# Patient Record
Sex: FEMALE | Race: OTHER | HISPANIC OR LATINO
[De-identification: names, ages, dates, MRNs, and addresses within clinical notes are randomized per-mention and may not be internally consistent; named-entity substitution may affect disease eponyms.]

---

## 2017-08-25 VITALS
SYSTOLIC BLOOD PRESSURE: 144 MMHG | HEART RATE: 59 BPM | RESPIRATION RATE: 15 BRPM | WEIGHT: 119 LBS | DIASTOLIC BLOOD PRESSURE: 74 MMHG | HEIGHT: 61 IN | BODY MASS INDEX: 22.47 KG/M2

## 2017-11-08 ENCOUNTER — RECORD ABSTRACTING (OUTPATIENT)
Age: 77
End: 2017-11-08

## 2017-11-08 DIAGNOSIS — M85.80 OTHER SPECIFIED DISORDERS OF BONE DENSITY AND STRUCTURE, UNSPECIFIED SITE: ICD-10-CM

## 2017-11-08 DIAGNOSIS — Z92.89 PERSONAL HISTORY OF OTHER MEDICAL TREATMENT: ICD-10-CM

## 2017-11-08 DIAGNOSIS — E53.8 DEFICIENCY OF OTHER SPECIFIED B GROUP VITAMINS: ICD-10-CM

## 2017-11-08 DIAGNOSIS — D64.9 ANEMIA, UNSPECIFIED: ICD-10-CM

## 2017-11-08 DIAGNOSIS — D21.9 BENIGN NEOPLASM OF CONNECTIVE AND OTHER SOFT TISSUE, UNSPECIFIED: ICD-10-CM

## 2017-11-21 ENCOUNTER — APPOINTMENT (OUTPATIENT)
Dept: ENDOCRINOLOGY | Facility: CLINIC | Age: 77
End: 2017-11-21
Payer: MEDICARE

## 2017-11-21 VITALS
TEMPERATURE: 98.9 F | HEART RATE: 93 BPM | SYSTOLIC BLOOD PRESSURE: 140 MMHG | RESPIRATION RATE: 16 BRPM | OXYGEN SATURATION: 97 % | DIASTOLIC BLOOD PRESSURE: 78 MMHG | HEIGHT: 61 IN | WEIGHT: 119 LBS | BODY MASS INDEX: 22.47 KG/M2

## 2017-11-21 LAB — GLUCOSE BLDC GLUCOMTR-MCNC: 161

## 2017-11-21 PROCEDURE — 82962 GLUCOSE BLOOD TEST: CPT

## 2017-11-21 PROCEDURE — 99214 OFFICE O/P EST MOD 30 MIN: CPT | Mod: 25

## 2017-11-21 RX ORDER — MOMETASONE FUROATE 1 MG/G
0.1 CREAM TOPICAL
Refills: 0 | Status: COMPLETED | COMMUNITY
End: 2017-11-21

## 2017-11-21 RX ORDER — PNV NO.95/FERROUS FUM/FOLIC AC 28MG-0.8MG
TABLET ORAL
Refills: 0 | Status: COMPLETED | COMMUNITY
End: 2017-11-21

## 2017-11-21 RX ORDER — LOSARTAN POTASSIUM 25 MG/1
25 TABLET, FILM COATED ORAL
Refills: 0 | Status: COMPLETED | COMMUNITY
End: 2017-11-21

## 2017-11-21 RX ORDER — GLIPIZIDE 2.5 MG/1
2.5 TABLET, EXTENDED RELEASE ORAL
Refills: 0 | Status: COMPLETED | COMMUNITY
End: 2017-11-21

## 2017-11-21 RX ORDER — SIMVASTATIN 10 MG/1
10 TABLET, FILM COATED ORAL
Refills: 0 | Status: COMPLETED | COMMUNITY
End: 2017-11-21

## 2017-11-21 RX ORDER — METFORMIN HYDROCHLORIDE 500 MG/1
500 TABLET, FILM COATED ORAL
Refills: 0 | Status: COMPLETED | COMMUNITY
End: 2017-11-21

## 2018-02-21 ENCOUNTER — APPOINTMENT (OUTPATIENT)
Dept: ENDOCRINOLOGY | Facility: CLINIC | Age: 78
End: 2018-02-21
Payer: MEDICARE

## 2018-02-21 VITALS
WEIGHT: 116 LBS | BODY MASS INDEX: 21.9 KG/M2 | DIASTOLIC BLOOD PRESSURE: 60 MMHG | RESPIRATION RATE: 16 BRPM | OXYGEN SATURATION: 96 % | HEIGHT: 61 IN | TEMPERATURE: 97.8 F | HEART RATE: 90 BPM | SYSTOLIC BLOOD PRESSURE: 132 MMHG

## 2018-02-21 PROCEDURE — 99214 OFFICE O/P EST MOD 30 MIN: CPT

## 2018-02-21 RX ORDER — CEFPODOXIME PROXETIL 100 MG/1
100 TABLET, FILM COATED ORAL
Qty: 20 | Refills: 0 | Status: COMPLETED | COMMUNITY
Start: 2018-02-05

## 2018-04-16 ENCOUNTER — MEDICATION RENEWAL (OUTPATIENT)
Age: 78
End: 2018-04-16

## 2018-05-21 ENCOUNTER — APPOINTMENT (OUTPATIENT)
Dept: ENDOCRINOLOGY | Facility: CLINIC | Age: 78
End: 2018-05-21
Payer: MEDICARE

## 2018-05-21 VITALS
HEART RATE: 61 BPM | DIASTOLIC BLOOD PRESSURE: 68 MMHG | HEIGHT: 61 IN | RESPIRATION RATE: 16 BRPM | WEIGHT: 114 LBS | SYSTOLIC BLOOD PRESSURE: 150 MMHG | OXYGEN SATURATION: 96 % | BODY MASS INDEX: 21.52 KG/M2

## 2018-05-21 DIAGNOSIS — Z83.3 FAMILY HISTORY OF DIABETES MELLITUS: ICD-10-CM

## 2018-05-21 LAB — GLUCOSE BLDC GLUCOMTR-MCNC: 153

## 2018-05-21 PROCEDURE — 99214 OFFICE O/P EST MOD 30 MIN: CPT | Mod: 25

## 2018-05-21 PROCEDURE — 82962 GLUCOSE BLOOD TEST: CPT

## 2018-09-12 ENCOUNTER — APPOINTMENT (OUTPATIENT)
Dept: ENDOCRINOLOGY | Facility: CLINIC | Age: 78
End: 2018-09-12
Payer: MEDICARE

## 2018-09-12 VITALS
DIASTOLIC BLOOD PRESSURE: 74 MMHG | TEMPERATURE: 97.5 F | SYSTOLIC BLOOD PRESSURE: 149 MMHG | RESPIRATION RATE: 16 BRPM | HEART RATE: 64 BPM | WEIGHT: 117 LBS | BODY MASS INDEX: 22.09 KG/M2 | HEIGHT: 61 IN | OXYGEN SATURATION: 97 %

## 2018-09-12 LAB — GLUCOSE BLDC GLUCOMTR-MCNC: 178

## 2018-09-12 PROCEDURE — 82962 GLUCOSE BLOOD TEST: CPT

## 2018-09-12 PROCEDURE — 99214 OFFICE O/P EST MOD 30 MIN: CPT | Mod: 25

## 2019-01-09 ENCOUNTER — APPOINTMENT (OUTPATIENT)
Dept: ENDOCRINOLOGY | Facility: CLINIC | Age: 79
End: 2019-01-09
Payer: MEDICARE

## 2019-01-09 VITALS
BODY MASS INDEX: 21.34 KG/M2 | HEART RATE: 62 BPM | DIASTOLIC BLOOD PRESSURE: 75 MMHG | RESPIRATION RATE: 16 BRPM | TEMPERATURE: 98.1 F | SYSTOLIC BLOOD PRESSURE: 174 MMHG | OXYGEN SATURATION: 98 % | HEIGHT: 61 IN | WEIGHT: 113 LBS

## 2019-01-09 LAB — GLUCOSE BLDC GLUCOMTR-MCNC: 194

## 2019-01-09 PROCEDURE — 82962 GLUCOSE BLOOD TEST: CPT

## 2019-01-09 PROCEDURE — 99214 OFFICE O/P EST MOD 30 MIN: CPT | Mod: 25

## 2019-01-09 NOTE — PHYSICAL EXAM
[Alert] : alert [No Acute Distress] : no acute distress [Normal Sclera/Conjunctiva] : normal sclera/conjunctiva [PERRL] : pupils equal, round and reactive to light [Normal Outer Ear/Nose] : the ears and nose were normal in appearance [No Neck Mass] : no neck mass was observed [Thyroid Not Enlarged] : the thyroid was not enlarged [No Respiratory Distress] : no respiratory distress [Normal Rate and Effort] : normal respiratory rhythm and effort [Normal PMI] : the apical impulse was normal [Normal Rate] : heart rate was normal  [Carotids Normal] : carotid pulses were normal with no bruits [Pedal Pulses Normal] : the pedal pulses are present [No Stigmata of Cushings Syndrome] : no stigmata of cushings syndrome [No Motor Deficits] : the motor exam was normal [No Sensory Deficits] : the sensory exam was normal to light touch and pinprick [Normal Sensation on Monofilament Testing] : normal sensation on monofilament testing of lower extremities [de-identified] : decrease hearing right ear

## 2019-01-09 NOTE — HISTORY OF PRESENT ILLNESS
[FreeTextEntry1] : Patient's diabetes is poorly controlled. The glucose levels at home are usually over 160 mg/dl. The FBS was 202 mg/dl and the HbA1c was high 9.1%, it has increased significantly since last visit.  Her blood glucose are elevated during the day. Rarely the blood glucose drops at night. Patient is not exercising or following the diet, she says with the Holidays he has been unable to follow the diet. Her weight has decreased about 4 lbs. The patient denies polydipsia, polyuria, increased appetite, blurred vision, weight gain, chest pain, leg edema. Her renal function is fine, she has no retinopathy, or neuropathy. Denies chest pain or palpitations. The hyperlipidemia is not well controlled the Tg are elevated. Takes her medications regularly. She has not seen the Ophthalmologist recently, has not seen the Podiatrist recently, has not seen the Cardiologist recently. The thyrooid tests are normal. The last US thyroid was 2/18 disclosed small nodule.\par \par \par \par

## 2019-01-09 NOTE — DATA REVIEWED
[FreeTextEntry1] : The FBS and HbA1c have deteriorated. The Triglycerides are elevated. The thyroid tests were normal. The renal function is fine. The US thyroid revealed the old nodules unchanged, new small nodules are present

## 2019-01-09 NOTE — ASSESSMENT
[FreeTextEntry1] : Poor diabetic control\par Advised to follow the diet and exercise\par The previous visit she was doing well\par Will raise the dose of metformin 500 mg po 1 tab AM plus 2 tablets PO PM\par Will renew the other medications\par

## 2019-04-30 ENCOUNTER — APPOINTMENT (OUTPATIENT)
Dept: ENDOCRINOLOGY | Facility: CLINIC | Age: 79
End: 2019-04-30
Payer: MEDICARE

## 2019-04-30 VITALS
HEART RATE: 69 BPM | TEMPERATURE: 97.6 F | RESPIRATION RATE: 16 BRPM | HEIGHT: 61 IN | BODY MASS INDEX: 21.34 KG/M2 | WEIGHT: 113 LBS | OXYGEN SATURATION: 95 % | DIASTOLIC BLOOD PRESSURE: 71 MMHG | SYSTOLIC BLOOD PRESSURE: 149 MMHG

## 2019-04-30 LAB — GLUCOSE BLDC GLUCOMTR-MCNC: 278

## 2019-04-30 PROCEDURE — 82962 GLUCOSE BLOOD TEST: CPT

## 2019-04-30 PROCEDURE — 99214 OFFICE O/P EST MOD 30 MIN: CPT | Mod: 25

## 2019-04-30 NOTE — ASSESSMENT
[FreeTextEntry1] : Improved diabetic control\par Will continue same treatment\par Patient is clinically euthyroid\par Will order an US thyroid

## 2019-04-30 NOTE — HISTORY OF PRESENT ILLNESS
[FreeTextEntry1] : Patient feels well, her sugar at home are fine around 130 mg/dl, the PPS today is elevated. Her weight is stable. The FBS was 137 mg/dl and the HbA1c was 7.1% improved from last visit. The LDL-C is fine but the Tg are elevated. The last US thyroid disclosed small nodule on 1/19

## 2019-04-30 NOTE — DATA REVIEWED
[FreeTextEntry1] : The TSH and HbA1c indicates improved control The Tg still elevated. The renal function is fine

## 2019-04-30 NOTE — PHYSICAL EXAM
[Alert] : alert [No Acute Distress] : no acute distress [Normal Sclera/Conjunctiva] : normal sclera/conjunctiva [PERRL] : pupils equal, round and reactive to light [Normal Outer Ear/Nose] : the ears and nose were normal in appearance [No Neck Mass] : no neck mass was observed [Thyroid Not Enlarged] : the thyroid was not enlarged [No Respiratory Distress] : no respiratory distress [Normal Rate and Effort] : normal respiratory rhythm and effort [Normal PMI] : the apical impulse was normal [Normal Rate] : heart rate was normal  [Carotids Normal] : carotid pulses were normal with no bruits [Pedal Pulses Normal] : the pedal pulses are present [No Stigmata of Cushings Syndrome] : no stigmata of cushings syndrome [No Motor Deficits] : the motor exam was normal [No Sensory Deficits] : the sensory exam was normal to light touch and pinprick [Normal Sensation on Monofilament Testing] : normal sensation on monofilament testing of lower extremities [de-identified] : decrease hearing right ear

## 2019-08-30 ENCOUNTER — APPOINTMENT (OUTPATIENT)
Dept: ENDOCRINOLOGY | Facility: CLINIC | Age: 79
End: 2019-08-30
Payer: MEDICARE

## 2019-08-30 VITALS
WEIGHT: 118 LBS | TEMPERATURE: 97.9 F | SYSTOLIC BLOOD PRESSURE: 156 MMHG | BODY MASS INDEX: 22.28 KG/M2 | RESPIRATION RATE: 16 BRPM | DIASTOLIC BLOOD PRESSURE: 76 MMHG | OXYGEN SATURATION: 97 % | HEIGHT: 61 IN | HEART RATE: 63 BPM

## 2019-08-30 LAB — GLUCOSE BLDC GLUCOMTR-MCNC: 165

## 2019-08-30 PROCEDURE — 82962 GLUCOSE BLOOD TEST: CPT

## 2019-08-30 PROCEDURE — 99214 OFFICE O/P EST MOD 30 MIN: CPT | Mod: 25

## 2019-08-30 NOTE — ASSESSMENT
[FreeTextEntry1] : The diabetic control has deteriorated\par Advised to take the medications regularly\par Follow the diet and exercise\par To see head and neck surgeon Dr. harrison for possible FNA biopsy of enlarging thyroid nodule\par Will renew medication

## 2019-08-30 NOTE — HISTORY OF PRESENT ILLNESS
[FreeTextEntry1] : Patient feels well asymptomatic. Her weight has not changed. She exercises regularly but she does not  follow the diet. Her blood glucose at home are well controlled, they are usually around 140 mg/dl. The FBS in the laboratory was 136 mg/dl and the HbA1c was 7.6 %. The renal function is fine. She denies low blood glucose during the night. She denies chest pain, denies SOB, denies numbness and tingling sensation and burning sensation on extremities. Her renal function is fine. Not taking her medications regularly. She has not seen the Ophthalmologist recently. She has not seen Podiatrist recently. She has not seen the Cardiologist recently.\par

## 2019-08-30 NOTE — PHYSICAL EXAM
[Alert] : alert [No Acute Distress] : no acute distress [Normal Sclera/Conjunctiva] : normal sclera/conjunctiva [PERRL] : pupils equal, round and reactive to light [Normal Outer Ear/Nose] : the ears and nose were normal in appearance [No Neck Mass] : no neck mass was observed [Thyroid Not Enlarged] : the thyroid was not enlarged [No Respiratory Distress] : no respiratory distress [Normal Rate and Effort] : normal respiratory rhythm and effort [Normal PMI] : the apical impulse was normal [Normal Rate] : heart rate was normal  [Carotids Normal] : carotid pulses were normal with no bruits [Pedal Pulses Normal] : the pedal pulses are present [No Stigmata of Cushings Syndrome] : no stigmata of cushings syndrome [No Motor Deficits] : the motor exam was normal [No Sensory Deficits] : the sensory exam was normal to light touch and pinprick [Normal Sensation on Monofilament Testing] : normal sensation on monofilament testing of lower extremities [de-identified] : decrease hearing right ear

## 2019-08-30 NOTE — DATA REVIEWED
[FreeTextEntry1] : The FBS and HbA1c have increased. The thyroid tests are normal. The thyroid nodule is slightly increased in size\par

## 2020-01-10 ENCOUNTER — APPOINTMENT (OUTPATIENT)
Dept: ENDOCRINOLOGY | Facility: CLINIC | Age: 80
End: 2020-01-10
Payer: MEDICARE

## 2020-01-10 VITALS
RESPIRATION RATE: 16 BRPM | OXYGEN SATURATION: 97 % | HEART RATE: 64 BPM | DIASTOLIC BLOOD PRESSURE: 75 MMHG | BODY MASS INDEX: 21.52 KG/M2 | TEMPERATURE: 97.9 F | SYSTOLIC BLOOD PRESSURE: 153 MMHG | WEIGHT: 114 LBS | HEIGHT: 61 IN

## 2020-01-10 LAB — GLUCOSE BLDC GLUCOMTR-MCNC: 113

## 2020-01-10 PROCEDURE — 82962 GLUCOSE BLOOD TEST: CPT

## 2020-01-10 PROCEDURE — 99214 OFFICE O/P EST MOD 30 MIN: CPT | Mod: 25

## 2020-01-10 NOTE — HISTORY OF PRESENT ILLNESS
[FreeTextEntry1] : Patient feels well , she is asymptomatic. Her weight has not changed. She is exercising regularly and following the diet. Her blood glucose at home are not/well controlled, they are usually around 110 mg/dl. The FBS in the laboratory was 124 mg/dl and the HbA1c was 6.4 %. The renal function is within normal limits, the microalbumin in the urine was normal. The lipid panel was within normal limits. She denies low blood glucose during the night. She denies chest pain, or SOB. Denies numbness, tingling or burning sensation on her extremities. Taking her medications regularly. She has not seen the Ophthalmologist recently. She has not seen Podiatrist recently. She has not seen the Cardiologist recently. She has to see a Dentist for a gum infection.\par

## 2020-01-10 NOTE — ASSESSMENT
[FreeTextEntry1] : The diabetes is well controlled\par Patient is clinically euthyroid\par Will repeat an US thyroid\par To see Dentist soon\par To see the Ophthalmologist and Cardiologist

## 2020-01-10 NOTE — PHYSICAL EXAM
[Alert] : alert [PERRL] : pupils equal, round and reactive to light [No Acute Distress] : no acute distress [Normal Sclera/Conjunctiva] : normal sclera/conjunctiva [Normal Outer Ear/Nose] : the ears and nose were normal in appearance [No Neck Mass] : no neck mass was observed [No Respiratory Distress] : no respiratory distress [Thyroid Not Enlarged] : the thyroid was not enlarged [Normal Rate and Effort] : normal respiratory rhythm and effort [Normal PMI] : the apical impulse was normal [Pedal Pulses Normal] : the pedal pulses are present [Normal Rate] : heart rate was normal  [Carotids Normal] : carotid pulses were normal with no bruits [No Motor Deficits] : the motor exam was normal [No Sensory Deficits] : the sensory exam was normal to light touch and pinprick [No Stigmata of Cushings Syndrome] : no stigmata of cushings syndrome [Normal Sensation on Monofilament Testing] : normal sensation on monofilament testing of lower extremities [de-identified] : decrease hearing right ear

## 2020-06-17 ENCOUNTER — APPOINTMENT (OUTPATIENT)
Dept: ENDOCRINOLOGY | Facility: CLINIC | Age: 80
End: 2020-06-17
Payer: MEDICARE

## 2020-06-17 VITALS
OXYGEN SATURATION: 98 % | HEIGHT: 61 IN | DIASTOLIC BLOOD PRESSURE: 83 MMHG | BODY MASS INDEX: 21.52 KG/M2 | RESPIRATION RATE: 16 BRPM | TEMPERATURE: 98.6 F | SYSTOLIC BLOOD PRESSURE: 161 MMHG | HEART RATE: 74 BPM | WEIGHT: 114 LBS

## 2020-06-17 DIAGNOSIS — E55.9 VITAMIN D DEFICIENCY, UNSPECIFIED: ICD-10-CM

## 2020-06-17 LAB — GLUCOSE BLDC GLUCOMTR-MCNC: 258

## 2020-06-17 PROCEDURE — 82962 GLUCOSE BLOOD TEST: CPT

## 2020-06-17 PROCEDURE — 99214 OFFICE O/P EST MOD 30 MIN: CPT | Mod: 25

## 2020-06-17 NOTE — HISTORY OF PRESENT ILLNESS
[FreeTextEntry1] : Patient feels well , she is asymptomatic. Her weight has not changed. She is exercising regularly and following the diet. Her blood glucose at home are not/well controlled, they are usually around 140 mg/dl. The FBS in the laboratory was 143 mg/dl and the HbA1c was 7 %, increased since last visit. The renal function is within normal limits. The lipid panel was within normal limits. She denies low blood glucose during the night. She denies chest pain, or SOB. Denies numbness, tingling or burning sensation on her extremities. Taking her medications regularly. \par

## 2020-06-17 NOTE — ASSESSMENT
[FreeTextEntry1] : The diabetes has deteriorated\par She had the COVID-19 infection\par She is clinically euthyroid \par She was unable to do the US thyroid\par Will continue same treatment

## 2020-06-17 NOTE — DATA REVIEWED
[FreeTextEntry1] : The FBS and HbA1c are slightly higher. The lipid panel is normal. except for HDL-C that is low and Tg that are elevated.

## 2020-06-17 NOTE — PHYSICAL EXAM
[Alert] : alert [No Acute Distress] : no acute distress [No Neck Mass] : no neck mass was observed [Thyroid Not Enlarged] : the thyroid was not enlarged [No Respiratory Distress] : no respiratory distress [Clear to Auscultation] : lungs were clear to auscultation bilaterally [Normal PMI] : the apical impulse was normal [Normal Rate] : heart rate was normal

## 2020-10-21 ENCOUNTER — APPOINTMENT (OUTPATIENT)
Dept: ENDOCRINOLOGY | Facility: CLINIC | Age: 80
End: 2020-10-21
Payer: MEDICARE

## 2020-10-21 VITALS
OXYGEN SATURATION: 97 % | TEMPERATURE: 98 F | RESPIRATION RATE: 16 BRPM | HEART RATE: 68 BPM | DIASTOLIC BLOOD PRESSURE: 84 MMHG | HEIGHT: 59 IN | WEIGHT: 118 LBS | SYSTOLIC BLOOD PRESSURE: 178 MMHG | BODY MASS INDEX: 23.79 KG/M2

## 2020-10-21 LAB — GLUCOSE BLDC GLUCOMTR-MCNC: 141

## 2020-10-21 PROCEDURE — 82962 GLUCOSE BLOOD TEST: CPT

## 2020-10-21 PROCEDURE — 99214 OFFICE O/P EST MOD 30 MIN: CPT | Mod: 25

## 2020-10-21 NOTE — HISTORY OF PRESENT ILLNESS
[FreeTextEntry1] : Patient feels well , she is asymptomatic. Her weight has increased. She is not exercising regularly but she is following the diet. Her blood glucose at home are well controlled, they are usually around 130 mg/dl. The FBS in the laboratory was 157 mg/dl and the HbA1c was 7.4 %increased since last visit. The renal function is within normal limits, the microalbumin in the urine was normal. The lipid panel was abnormal. She denies low blood glucose during the night. She denies chest pain, or SOB. Denies numbness, tingling or burning sensation on her extremities. Taking her medications regularly. She has not seen the Ophthalmologist recently. She has not seen Podiatrist recently. She has not seen the Cardiologist recently.\par

## 2020-10-21 NOTE — DATA REVIEWED
[FreeTextEntry1] : The FBS and HbA1c are increased. the Tg are elevated. The TSH and Free t4 are elevated.

## 2020-10-21 NOTE — ASSESSMENT
[FreeTextEntry1] : The diabetic control has deteriorated\par Will raise the metformin to 1 tablet PO AM plus 2 tablets PO PM\par She is clinically euthyroid\par Will order an US thyroid

## 2020-10-21 NOTE — REASON FOR VISIT
[Follow - Up] : a follow-up visit [DM Type 2] : DM Type 2 [Hypothyroidism] : hypothyroidism [Thyroid nodule/ MNG] : thyroid nodule/ MNG

## 2020-11-09 ENCOUNTER — NON-APPOINTMENT (OUTPATIENT)
Age: 80
End: 2020-11-09

## 2020-11-09 ENCOUNTER — APPOINTMENT (OUTPATIENT)
Dept: CARDIOLOGY | Facility: CLINIC | Age: 80
End: 2020-11-09
Payer: MEDICARE

## 2020-11-09 VITALS
WEIGHT: 116 LBS | HEART RATE: 63 BPM | RESPIRATION RATE: 16 BRPM | TEMPERATURE: 98.1 F | DIASTOLIC BLOOD PRESSURE: 69 MMHG | SYSTOLIC BLOOD PRESSURE: 149 MMHG | BODY MASS INDEX: 23.39 KG/M2 | HEIGHT: 59 IN | OXYGEN SATURATION: 98 %

## 2020-11-09 DIAGNOSIS — M25.473 EFFUSION, UNSPECIFIED ANKLE: ICD-10-CM

## 2020-11-09 DIAGNOSIS — Z13.6 ENCOUNTER FOR SCREENING FOR CARDIOVASCULAR DISORDERS: ICD-10-CM

## 2020-11-09 PROCEDURE — 99072 ADDL SUPL MATRL&STAF TM PHE: CPT

## 2020-11-09 PROCEDURE — 99205 OFFICE O/P NEW HI 60 MIN: CPT

## 2020-11-09 PROCEDURE — 93000 ELECTROCARDIOGRAM COMPLETE: CPT

## 2020-11-14 PROBLEM — Z13.6 SCREENING FOR CARDIOVASCULAR CONDITION: Status: ACTIVE | Noted: 2020-11-14

## 2021-02-23 ENCOUNTER — APPOINTMENT (OUTPATIENT)
Dept: ENDOCRINOLOGY | Facility: CLINIC | Age: 81
End: 2021-02-23
Payer: MEDICARE

## 2021-02-23 VITALS
WEIGHT: 118 LBS | RESPIRATION RATE: 16 BRPM | SYSTOLIC BLOOD PRESSURE: 159 MMHG | BODY MASS INDEX: 23.79 KG/M2 | OXYGEN SATURATION: 98 % | DIASTOLIC BLOOD PRESSURE: 82 MMHG | TEMPERATURE: 98 F | HEIGHT: 59 IN | HEART RATE: 63 BPM

## 2021-02-23 LAB — GLUCOSE BLDC GLUCOMTR-MCNC: 185

## 2021-02-23 PROCEDURE — 99214 OFFICE O/P EST MOD 30 MIN: CPT | Mod: 25

## 2021-02-23 PROCEDURE — 82962 GLUCOSE BLOOD TEST: CPT

## 2021-02-23 PROCEDURE — 99072 ADDL SUPL MATRL&STAF TM PHE: CPT

## 2021-02-23 NOTE — ASSESSMENT
[FreeTextEntry1] : The diabetes is well controlled\par Her weight is stable\par Will continue same treatment\par The thyroid tests were normal\par The US thyroid unchanged\par Will continue same treatment

## 2021-02-23 NOTE — DATA REVIEWED
[FreeTextEntry1] : The FBS and hbA1c indicates good contro. the TSH and free t4 were normal. The US thyroid was unchanged.

## 2021-02-23 NOTE — HISTORY OF PRESENT ILLNESS
[FreeTextEntry1] : Patient feels well , she is asymptomatic. Her weight has not changed. She is not walking regularly but she is following the diet. Her blood glucose at home are well controlled, they are usually around 100 mg/dl. The FBS in the laboratory was 131 mg/dl and the HbA1c was 6.5 %, improved since last visit. The renal function is within normal limits, the microalbumin in the urine was normal. The lipid panel was within normal limits except for the elevated Tg. She denies low blood glucose during the night. She denies chest pain, or SOB. Denies numbness, tingling or burning sensation on her extremities. Taking her medications regularly. She has seen the Ophthalmologist recently. She seen Podiatrist recently. She has seen the Cardiologist recently.\par

## 2021-03-21 ENCOUNTER — APPOINTMENT (OUTPATIENT)
Age: 81
End: 2021-03-21

## 2021-06-15 ENCOUNTER — APPOINTMENT (OUTPATIENT)
Dept: ENDOCRINOLOGY | Facility: CLINIC | Age: 81
End: 2021-06-15
Payer: MEDICARE

## 2021-06-15 VITALS
RESPIRATION RATE: 16 BRPM | HEIGHT: 59 IN | DIASTOLIC BLOOD PRESSURE: 76 MMHG | SYSTOLIC BLOOD PRESSURE: 161 MMHG | OXYGEN SATURATION: 97 % | WEIGHT: 118 LBS | TEMPERATURE: 98.4 F | HEART RATE: 72 BPM | BODY MASS INDEX: 23.79 KG/M2

## 2021-06-15 LAB — GLUCOSE BLDC GLUCOMTR-MCNC: 142

## 2021-06-15 PROCEDURE — 82962 GLUCOSE BLOOD TEST: CPT

## 2021-06-15 PROCEDURE — 99214 OFFICE O/P EST MOD 30 MIN: CPT | Mod: 25

## 2021-06-15 NOTE — HISTORY OF PRESENT ILLNESS
[FreeTextEntry1] : Patient feels well , she is asymptomatic. Her weight has not changed. She is exercising regularly and following the diet. Her blood glucose at home are not/well controlled, they are usually around 120 to 130 mg/dl. The FBS in the laboratory was 135 mg/dl and the HbA1c was 6.7 %. The renal function is within normal limits, the microalbumin in the urine was normal. The lipid panel was within normal limits. She denies low blood glucose during the night. She denies chest pain, or SOB. Denies numbness, tingling or burning sensation on her extremities. Taking her medications regularly. She has not seen the Ophthalmologist recently. She has not seen Podiatrist recently. She has not seen the Cardiologist recently.\par

## 2021-06-15 NOTE — PHYSICAL EXAM
[Alert] : alert [No Acute Distress] : no acute distress [No Neck Mass] : no neck mass was observed [Thyroid Not Enlarged] : the thyroid was not enlarged [Clear to Auscultation] : lungs were clear to auscultation bilaterally [No Respiratory Distress] : no respiratory distress [Normal PMI] : the apical impulse was normal [Normal Rate] : heart rate was normal

## 2021-06-15 NOTE — ASSESSMENT
[FreeTextEntry1] : The diabetes is well controlled\par Will continue same treatment\par Advised to follow a low CHO, low fat diet\par Her Tg are elevated\par Her Vitamin D is low \par Give Vit D 2000 units po QD\par Advised to take the Caltrate 600 plus Vit D 2 times per day

## 2021-06-15 NOTE — DATA REVIEWED
[FreeTextEntry1] : The US thyroid was stable 2/21. The FBS and HbA1c indicates good diabetic control. The Tg were elevated. her Vit D was low.

## 2021-10-12 ENCOUNTER — APPOINTMENT (OUTPATIENT)
Dept: ENDOCRINOLOGY | Facility: CLINIC | Age: 81
End: 2021-10-12
Payer: MEDICARE

## 2021-10-12 VITALS
HEIGHT: 59 IN | TEMPERATURE: 97.9 F | OXYGEN SATURATION: 97 % | DIASTOLIC BLOOD PRESSURE: 81 MMHG | SYSTOLIC BLOOD PRESSURE: 162 MMHG | HEART RATE: 62 BPM | RESPIRATION RATE: 16 BRPM | BODY MASS INDEX: 23.79 KG/M2 | WEIGHT: 118 LBS

## 2021-10-12 LAB — GLUCOSE BLDC GLUCOMTR-MCNC: 133

## 2021-10-12 PROCEDURE — 99214 OFFICE O/P EST MOD 30 MIN: CPT | Mod: 25

## 2021-10-12 PROCEDURE — 82962 GLUCOSE BLOOD TEST: CPT

## 2021-10-12 RX ORDER — BLOOD SUGAR DIAGNOSTIC
STRIP MISCELLANEOUS
Qty: 100 | Refills: 3 | Status: ACTIVE | COMMUNITY
Start: 2021-10-12 | End: 1900-01-01

## 2021-10-12 NOTE — ASSESSMENT
[FreeTextEntry1] : The diabetes is well controlled\par Will continue the same treatment\par Her weight is stable\par Advised to continue walking and following the diet\par The triglycerides are elevated\par Given a low CHO, low fat diet\par Continue the Vit D tablets\par Advised to see her PCP

## 2021-10-12 NOTE — HISTORY OF PRESENT ILLNESS
[FreeTextEntry1] : Patient feels well , she is asymptomatic. Her weight has not changed. She is exercising regularly and following the diet. Her blood glucose at home are well controlled, they are usually around 120 mg/dl. The FBS in the laboratory was 132 mg/dl and the HbA1c was 6.8 %. The renal function is within normal limits, the microalbumin in the urine was normal. The lipid panel was abnormal. She denies low blood glucose during the night. She denies chest pain, or SOB. Denies numbness, tingling or burning sensation on her extremities. Taking her medications regularly. \par

## 2021-10-12 NOTE — DATA REVIEWED
[FreeTextEntry1] : The US thyroid was fine 2/21. The FBS and hbA1c indicates good diabetic control. The lipid panel was fine except for the elevated Tg. The TSH and free t4 were normal. Her Vitamin D was normal.

## 2022-02-14 ENCOUNTER — APPOINTMENT (OUTPATIENT)
Dept: ENDOCRINOLOGY | Facility: CLINIC | Age: 82
End: 2022-02-14
Payer: MEDICARE

## 2022-02-14 VITALS
BODY MASS INDEX: 23.39 KG/M2 | WEIGHT: 116 LBS | TEMPERATURE: 98.2 F | SYSTOLIC BLOOD PRESSURE: 148 MMHG | HEIGHT: 59 IN | RESPIRATION RATE: 16 BRPM | OXYGEN SATURATION: 98 % | DIASTOLIC BLOOD PRESSURE: 72 MMHG | HEART RATE: 73 BPM

## 2022-02-14 LAB — GLUCOSE BLDC GLUCOMTR-MCNC: 208

## 2022-02-14 PROCEDURE — 99214 OFFICE O/P EST MOD 30 MIN: CPT | Mod: 25

## 2022-02-14 PROCEDURE — 82962 GLUCOSE BLOOD TEST: CPT

## 2022-02-14 RX ORDER — SIMVASTATIN 10 MG/1
10 TABLET, FILM COATED ORAL
Qty: 90 | Refills: 3 | Status: ACTIVE | COMMUNITY
Start: 1900-01-01 | End: 1900-01-01

## 2022-02-14 NOTE — DATA REVIEWED
[FreeTextEntry1] : The FBS and hbA1c indicates good diabetic control. her Tg are elevated. The TSH and free t4 are normal. Her Vitamin D is normal

## 2022-02-14 NOTE — PHYSICAL EXAM
Patient : Amanda Sanchez Age: 25 year old Sex: female   MRN: [id number] Encounter Date: 12/31/2021      History     Chief Complaint   Patient presents with   • Chest Pain Adult     HPI     Pt is a 25-year-old female with a past medical history of GERD, depression and anxiety who presents to the ER with complaints of midsternal chest pain that started this morning.  Pain is nonradiating, constant, 8 out 10 in severity.  Pain is worse with movement and palpation.  No injury or trauma.  She denies shortness of breath.  No peripheral edema.  Denies abdominal pain, nausea, vomiting, diarrhea, loss of taste are small, recent sick contacts, sore throat, fevers or chills. Patient denies any further complaints or modifying factors at this time.       No Known Allergies    Current Discharge Medication List      Prior to Admission Medications    Details   ondansetron (Zofran ODT) 4 MG disintegrating tablet Place 1 tablet onto the tongue every 8 hours as needed for Nausea.  Qty: 10 tablet, Refills: 0      isosorbide mononitrate (IMDUR) 30 MG 24 hr tablet TAKE ONE TABLET BY MOUTH Once Daily  Qty: 30 tablet, Refills: 11      sucralfate (Carafate) 1 GM/10ML suspension Take 10 mLs by mouth 3 times daily.  Qty: 420 mL, Refills: 0      metoCLOPramide (Reglan) 10 MG tablet Take 1 tablet by mouth every 8 hours as needed for Nausea or Vomiting.  Qty: 20 tablet, Refills: 0      pantoprazole (Protonix) 40 MG tablet Take 1 tablet by mouth daily.  Qty: 30 tablet, Refills: 0      sucralfate (CARAFATE) 1 GM/10ML suspension Take 10 mLs by mouth 4 times daily.  Qty: 180 mL, Refills: 0      naproxen (Naprosyn) 250 MG tablet Take 1 tablet by mouth 2 times daily (with meals).  Qty: 14 tablet, Refills: 0      pantoprazole (Protonix) 20 MG tablet Take 1 tablet by mouth daily.  Qty: 14 tablet, Refills: 0      cyclobenzaprine (FLEXERIL) 10 MG tablet Take 1 tablet by mouth 3 times daily as needed for Muscle spasms.  Qty: 15 tablet, Refills: 0       LORazepam (Ativan) 0.5 MG tablet Take 1 tablet by mouth every 8 hours as needed for Anxiety.  Qty: 8 tablet, Refills: 0      ondansetron (Zofran ODT) 4 MG disintegrating tablet Place 1 tablet onto the tongue 3 times daily as needed for Nausea.  Qty: 20 tablet, Refills: 0      famotidine (Pepcid) 20 MG tablet Take 1 tablet by mouth 2 times daily.  Qty: 60 tablet, Refills: 0      omeprazole (PRILOSEC) 40 MG capsule Take 1 capsule by mouth daily.  Qty: 30 capsule, Refills: 3      medroxyPROGESTERone (DEPO-PROVERA) 150 MG/ML injectable suspension Inject 1 mL into the muscle Prior to discharge (contraception).  Qty: 1 mL, Refills: 0      Ferrous Sulfate (IRON) 325 (65 Fe) MG Tab Take 1 tablet by mouth daily.  Qty: 90 tablet, Refills: 3             Past Medical History:   Diagnosis Date   • Anemia of pregnancy 1/12/2020   • Anxiety    • Depression    • Gastroesophageal reflux disease    • STD (sexually transmitted disease)     2015 and 2016 trich   • UTI (lower urinary tract infection) 2014       No past surgical history on file.    Family History   Problem Relation Age of Onset   • Diabetes Maternal Aunt    • Osteoarthritis Mother    • Diabetes Mother    • Asthma Sister    • High blood pressure Maternal Aunt    • Cancer Other         unknown type       Social History     Tobacco Use   • Smoking status: Never Smoker   • Smokeless tobacco: Never Used   Substance Use Topics   • Alcohol use: No     Alcohol/week: 0.0 standard drinks   • Drug use: No       E-cigarette/Vaping     E-Cigarette/Vaping Substances & Devices       Review of Systems   Constitutional: Negative.    HENT: Negative.    Eyes: Negative.    Respiratory: Negative.    Cardiovascular: Positive for chest pain. Negative for palpitations and leg swelling.   Gastrointestinal: Negative.    Genitourinary: Negative.    Musculoskeletal: Negative.    Skin: Negative.    Neurological: Negative.    Psychiatric/Behavioral: Negative.        Physical Exam     ED Triage  Vitals [12/31/21 0926]   ED Triage Vitals Group      Temp 98.9 °F (37.2 °C)      Heart Rate 61      Resp 18      /69      SpO2 98 %      EtCO2 mmHg       Height 4' 11\" (1.499 m)      Weight 158 lb (71.7 kg)      Weight Scale Used ED Stated      BMI (Calculated) 31.91      IBW/kg (Calculated) 43.2       Physical Exam  Vitals and nursing note reviewed.   Constitutional:       General: She is not in acute distress.     Appearance: She is well-developed. She is not ill-appearing.   HENT:      Head: Atraumatic. No contusion.      Jaw: There is normal jaw occlusion.      Right Ear: Hearing, tympanic membrane, ear canal and external ear normal.      Left Ear: Hearing, tympanic membrane, ear canal and external ear normal.      Nose: Nose normal.      Mouth/Throat:      Mouth: Mucous membranes are moist. No lacerations.      Dentition: Normal dentition.      Pharynx: Oropharynx is clear. Uvula midline. No pharyngeal swelling, oropharyngeal exudate, posterior oropharyngeal erythema or uvula swelling.   Eyes:      Extraocular Movements: Extraocular movements intact.      Pupils: Pupils are equal, round, and reactive to light.   Cardiovascular:      Rate and Rhythm: Normal rate and regular rhythm.      Heart sounds: Normal heart sounds.   Pulmonary:      Effort: Pulmonary effort is normal.      Breath sounds: Normal breath sounds.   Musculoskeletal:         General: Normal range of motion.      Cervical back: Normal range of motion and neck supple.   Skin:     General: Skin is warm.      Capillary Refill: Capillary refill takes less than 2 seconds.   Neurological:      General: No focal deficit present.      Mental Status: She is alert and oriented to person, place, and time.      Cranial Nerves: No cranial nerve deficit.      Motor: No weakness.   Psychiatric:         Mood and Affect: Mood normal.         Behavior: Behavior normal.         ED Course     Procedures    Lab Results     Results for orders placed or performed  during the hospital encounter of 12/31/21   ISTAT8 VENOUS  POINT OF CARE   Result Value Ref Range    BUN - POINT OF CARE 13 6 - 20 mg/dL    SODIUM - POINT OF CARE 140 135 - 145 mmol/L    POTASSIUM - POINT OF CARE 3.9 3.4 - 5.1 mmol/L    CHLORIDE - POINT OF CARE 101 98 - 107 mmol/L    TCO2 - POINT OF CARE 25 (H) 19 - 24 mmol/L    ANION GAP - POINT OF CARE 18 10 - 20 mmol/L    HEMATOCRIT - POINT OF CARE 40.0 36.0 - 46.5 %    HEMOGLOBIN - POINT OF CARE 13.6 12.0 - 15.5 g/dL    GLUCOSE - POINT OF CARE 103 (H) 70 - 99 mg/dL    CALCIUM, IONIZED - POINT OF CARE 1.30 (H) 1.15 - 1.29 mmol/L    Creatinine 0.80 0.51 - 0.95 mg/dL    Glomerular Filtration Rate >90 >=60   TROPONIN I  POINT OF CARE   Result Value Ref Range    TROPONIN I - POINT OF CARE <0.10 <0.10 ng/mL   ISTAT BETA HCG - POINT OF CARE   Result Value Ref Range    ISTAT BETA HCG - POINT OF CARE <5.0 <5.0 IU/L       EKG Results     EKG Interpretation  Rate: 85  Rhythm: normal sinus rhythm   Abnormality: No significant change     EKG tracing interpreted by ED physician    Radiology Results     Imaging Results          XR CHEST AP OR PA - PORTABLE (Final result)  Result time 12/31/21 10:52:15    Final result                 Impression:    IMPRESSION:    No acute findings.                   Narrative:    EXAM: XR CHEST AP OR PA    CLINICAL HISTORY: midsternal chest pain    COMPARISON: 11/2/2021    FINDINGS: The cardiac silhouette and vascularity are within normal limits.    Lungs are well aerated and clear without acute consolidation or effusion.                                ED Medication Orders (From admission, onward)    Ordered Start     Status Ordering Provider    12/31/21 0958 12/31/21 0959  ketorolac injection 15 mg  ONCE         Last MAR action: Given BREN PALMER     Patient is a 25-year-old female presents emergency department with complaints of midsternal chest pain.  Midsternal chest pain is reproducible on palpation.  Respirations  [Alert] : alert are regular and unlabored clear to auscultation.  There is no peripheral edema noted on exam.  EKG unremarkable.  Troponin negative.  Pregnancy negative.  I-STAT stable.  Heart score of 0.  Patient was given a dose of Toradol and reports that she feels better.  She also would like to get tested for COVID.  COVID test pending.  Quarantine precautions discussed.  Strict return precautions given the emergency department.  Supportive treatment recommended at home.  Discharged in stable condition.    I have discussed the diagnosis and risks, as well as follow-up with the patient's PCP. I discussed the possible diagnoses with the patient as well as the warning signs and symptoms. The patient understands that this is a provisional diagnosis which can and do change. The diagnosis that the patient was discharged with today is based on the symptoms with which they presented. I discussed in great length returning to the ED immediately if new or worsening symptoms occur. I discussed the symptoms that are most concerning that necessitate immediate return. The patient verbalizes understanding of all discharge instructions, stating there are no further questions and/or concerns at this time. The patient was discharged home, ambulatory in stable condition.     Clinical Impression     ED Diagnosis   1. Chest wall pain         Disposition        Discharge 12/31/2021 11:01 AM  Amanda Sanchez discharge to home/self care.                         KALYAN Sharif  12/31/21 4018     [No Acute Distress] : no acute distress [No Neck Mass] : no neck mass was observed [Thyroid Not Enlarged] : the thyroid was not enlarged [No Respiratory Distress] : no respiratory distress [Clear to Auscultation] : lungs were clear to auscultation bilaterally [Normal PMI] : the apical impulse was normal [Normal Rate] : heart rate was normal [Right foot was examined, including] : right foot ~C was examined, including visual inspection with sensory and pulse exams [Left foot was examined, including] : left foot ~C was examined, including visual inspection with sensory and pulse exams [Normal] : normal [2+] : 2+ in the dorsalis pedis [Diminished Throughout Both Feet] : normal tactile sensation with monofilament testing throughout both feet

## 2022-02-14 NOTE — HISTORY OF PRESENT ILLNESS
[FreeTextEntry1] : Patient feels well , she is asymptomatic. Her weight has not changed. She is not exercising regularly and following the diet. Her blood glucose at home are well controlled, they are usually around 130 mg/dl. The FBS in the laboratory was 127 mg/dl and the HbA1c was 6.8 % stable since last visit. The renal function is within normal limits. The lipid panel was abnormal, indicated elevated triglycerides. She denies low blood glucose during the night. She denies chest pain, or SOB. Denies numbness, tingling or burning sensation on her extremities. Taking her medications regularly. She has seen the Ophthalmologist recently. She has not seen Podiatrist recently. She has not seen the Cardiologist recently.\par

## 2022-02-14 NOTE — ASSESSMENT
[FreeTextEntry1] : Patient's diabetes is well controlled\par She has lost weight\par She is clinically euthyroid\par Her Tg are elevated\par Advised to follow a low CHO low fat diet\par Will continue the same treatment

## 2022-06-17 ENCOUNTER — APPOINTMENT (OUTPATIENT)
Dept: ENDOCRINOLOGY | Facility: CLINIC | Age: 82
End: 2022-06-17
Payer: MEDICARE

## 2022-06-17 VITALS
RESPIRATION RATE: 16 BRPM | WEIGHT: 118 LBS | HEIGHT: 59 IN | BODY MASS INDEX: 23.79 KG/M2 | TEMPERATURE: 98 F | OXYGEN SATURATION: 98 % | HEART RATE: 64 BPM | DIASTOLIC BLOOD PRESSURE: 73 MMHG | SYSTOLIC BLOOD PRESSURE: 152 MMHG

## 2022-06-17 LAB — GLUCOSE BLDC GLUCOMTR-MCNC: 118

## 2022-06-17 PROCEDURE — 82962 GLUCOSE BLOOD TEST: CPT

## 2022-06-17 PROCEDURE — 99214 OFFICE O/P EST MOD 30 MIN: CPT | Mod: 25

## 2022-06-17 NOTE — ASSESSMENT
[FreeTextEntry1] : Patient's diabetes is well controlled\par Her weight is stable\par Her triglycerides are elevated\par Advised to follow a low CHO, low fat diet\par She is clinically euthyroid\par Will order a new US thyroid\par Will continue the same treatment

## 2022-06-17 NOTE — DATA REVIEWED
[FreeTextEntry1] : The TSH and free t4 were normal. The Tg are elevated. The FBS and hbA1c indicates good diabetic control. her renal function is fine. The microalbumin in the urine was fine.

## 2022-06-17 NOTE — HISTORY OF PRESENT ILLNESS
[FreeTextEntry1] : Patient feels well , she is asymptomatic. Her weight has increased. She is exercising regularly and following the diet. Her blood glucose at home are well controlled, they are usually around 120 to 130 mg/dl. She denies low blood glucose during the night. She denies chest pain, or SOB. Denies numbness, tingling or burning sensation on her extremities. Taking her medications regularly. She has seen the Ophthalmologist recently. She has not seen Podiatrist recently. She has not seen the Cardiologist recently.\par

## 2022-06-17 NOTE — PHYSICAL EXAM
[Alert] : alert [No Acute Distress] : no acute distress [No Neck Mass] : no neck mass was observed [Thyroid Not Enlarged] : the thyroid was not enlarged [No Respiratory Distress] : no respiratory distress [Clear to Auscultation] : lungs were clear to auscultation bilaterally [Normal PMI] : the apical impulse was normal [Normal Rate] : heart rate was normal [Normal] : normal [2+] : 2+ in the dorsalis pedis [Diminished Throughout Both Feet] : normal tactile sensation with monofilament testing throughout both feet

## 2022-10-04 ENCOUNTER — APPOINTMENT (OUTPATIENT)
Dept: ENDOCRINOLOGY | Facility: CLINIC | Age: 82
End: 2022-10-04

## 2022-10-18 ENCOUNTER — APPOINTMENT (OUTPATIENT)
Dept: ENDOCRINOLOGY | Facility: CLINIC | Age: 82
End: 2022-10-18

## 2022-10-18 VITALS
HEART RATE: 75 BPM | SYSTOLIC BLOOD PRESSURE: 151 MMHG | RESPIRATION RATE: 16 BRPM | TEMPERATURE: 98 F | BODY MASS INDEX: 22.78 KG/M2 | HEIGHT: 59 IN | DIASTOLIC BLOOD PRESSURE: 80 MMHG | OXYGEN SATURATION: 98 % | WEIGHT: 113 LBS

## 2022-10-18 DIAGNOSIS — I10 ESSENTIAL (PRIMARY) HYPERTENSION: ICD-10-CM

## 2022-10-18 LAB — GLUCOSE BLDC GLUCOMTR-MCNC: 206

## 2022-10-18 PROCEDURE — 99214 OFFICE O/P EST MOD 30 MIN: CPT

## 2022-10-18 PROCEDURE — 82962 GLUCOSE BLOOD TEST: CPT

## 2022-10-18 RX ORDER — LOSARTAN POTASSIUM 25 MG/1
25 TABLET, FILM COATED ORAL
Qty: 90 | Refills: 3 | Status: ACTIVE | COMMUNITY

## 2022-10-18 NOTE — ASSESSMENT
[FreeTextEntry1] : The diabetes remains well controlled\par She has lost weight\par The LDL-C is fine but the Tg are elevated\par Given a low CHO, low fat diet\par She has microalbuminuria, she is in Losartan\par She is clinically euthyroid\par Will continue the same treatment

## 2022-10-18 NOTE — HISTORY OF PRESENT ILLNESS
[FreeTextEntry1] : Patient feels well , she is asymptomatic. She has lost weight. She is walking regularly and following the diet. Her blood glucose at home are well controlled. She denies low blood glucose during the night. She denies chest pain, or SOB. Denies numbness, tingling or burning sensation on her extremities. Taking her medications regularly. She has seen the Ophthalmologist recently. \par

## 2022-10-18 NOTE — DATA REVIEWED
[FreeTextEntry1] : The FBS and HbA1c are slightly increased but still indicates good diabetic control. Her renal function is fine except for the microalbumin which is increased. Her TSH and free t4 were normal. The US thyroid revealed small nodules.

## 2023-02-03 ENCOUNTER — APPOINTMENT (OUTPATIENT)
Dept: ENDOCRINOLOGY | Facility: CLINIC | Age: 83
End: 2023-02-03
Payer: MEDICARE

## 2023-02-03 VITALS
BODY MASS INDEX: 23.79 KG/M2 | HEART RATE: 81 BPM | TEMPERATURE: 98.3 F | RESPIRATION RATE: 16 BRPM | DIASTOLIC BLOOD PRESSURE: 77 MMHG | SYSTOLIC BLOOD PRESSURE: 146 MMHG | HEIGHT: 59 IN | WEIGHT: 118 LBS | OXYGEN SATURATION: 97 %

## 2023-02-03 DIAGNOSIS — E78.5 HYPERLIPIDEMIA, UNSPECIFIED: ICD-10-CM

## 2023-02-03 LAB — GLUCOSE BLDC GLUCOMTR-MCNC: 180

## 2023-02-03 PROCEDURE — 99214 OFFICE O/P EST MOD 30 MIN: CPT | Mod: 25

## 2023-02-03 PROCEDURE — 82962 GLUCOSE BLOOD TEST: CPT

## 2023-02-03 NOTE — DATA REVIEWED
[FreeTextEntry1] : The FBS and hbA1c indicates good diabetic control. The microalbuminuria has improved. Her TSH and free t4 are normal. The renal function is fine.

## 2023-02-03 NOTE — ASSESSMENT
[FreeTextEntry1] : The diabetes remains well controlled\par She has gained weight\par She has seen the Ophthalmologist, no retinopathy\par She is clinically euthyroid\par The US thyroid on 9/22 was stable\par Will continue the same treatment\par

## 2023-02-03 NOTE — HISTORY OF PRESENT ILLNESS
[FreeTextEntry1] : Patient feels well , she is asymptomatic. She has gained weight. She is walking regularly and following the diet. Her blood glucose at home are well controlled, they are usually around 110 to 130 mg/dl. She denies low blood glucose during the night. She denies chest pain, or SOB. Denies numbness, tingling or burning sensation on her extremities. Taking her medications regularly. She has seen the Ophthalmologist recently. She has not seen Podiatrist recently. She has not seen the Cardiologist recently.\par

## 2023-02-03 NOTE — PHYSICAL EXAM
[Alert] : alert [No Acute Distress] : no acute distress [No Neck Mass] : no neck mass was observed [Thyroid Not Enlarged] : the thyroid was not enlarged [No Respiratory Distress] : no respiratory distress [Clear to Auscultation] : lungs were clear to auscultation bilaterally [Normal PMI] : the apical impulse was normal [Normal Rate] : heart rate was normal [Right foot was examined, including] : right foot ~C was examined, including visual inspection with sensory and pulse exams [Left foot was examined, including] : left foot ~C was examined, including visual inspection with sensory and pulse exams [Normal] : normal [2+] : 2+ in the dorsalis pedis [Diminished Throughout Both Feet] : normal tactile sensation with monofilament testing throughout both feet

## 2023-06-09 ENCOUNTER — APPOINTMENT (OUTPATIENT)
Dept: ENDOCRINOLOGY | Facility: CLINIC | Age: 83
End: 2023-06-09
Payer: MEDICARE

## 2023-06-09 VITALS
TEMPERATURE: 98.4 F | WEIGHT: 114 LBS | RESPIRATION RATE: 16 BRPM | BODY MASS INDEX: 22.98 KG/M2 | SYSTOLIC BLOOD PRESSURE: 166 MMHG | HEART RATE: 71 BPM | OXYGEN SATURATION: 98 % | HEIGHT: 59 IN | DIASTOLIC BLOOD PRESSURE: 73 MMHG

## 2023-06-09 LAB — GLUCOSE BLDC GLUCOMTR-MCNC: 216

## 2023-06-09 PROCEDURE — 82962 GLUCOSE BLOOD TEST: CPT

## 2023-06-09 PROCEDURE — 99214 OFFICE O/P EST MOD 30 MIN: CPT | Mod: 25

## 2023-06-09 RX ORDER — MULTIVIT-MIN/FOLIC/VIT K/LYCOP 400-300MCG
25 MCG TABLET ORAL
Qty: 90 | Refills: 3 | Status: ACTIVE | COMMUNITY
Start: 2021-06-15 | End: 1900-01-01

## 2023-06-09 NOTE — DATA REVIEWED
[FreeTextEntry1] : The FBS and HbA1c indicates good diabetic control. The renal function is fine. There is mild microalbuminuria, improved. The lipid panel was fine except for the Triglycerides that were elevated. The TSH and Free T4 were normal. The thyroid nodules were stable 9/22\par

## 2023-06-09 NOTE — HISTORY OF PRESENT ILLNESS
[FreeTextEntry1] : Patient feels well , she is asymptomatic. Her weight has decreased. She is exercising regularly and following the diet. Her blood glucose at home are not/well controlled, they are usually around 110 mg/dl. She denies low blood glucose during the night. She denies chest pain, or SOB. Denies numbness, tingling or burning sensation on her extremities. Taking her medications regularly. She has not seen the Ophthalmologist recently. She has not seen Podiatrist recently. She has not seen the Cardiologist recently.\par

## 2023-06-09 NOTE — ASSESSMENT
[FreeTextEntry1] : Patient is doing well\par Her weight has decreased\par The diabetes is well controlled\par No diabetic Retinopathy or Nephropathy\par The lipid panel was fine except for elevated Tg\par Advised to follow the diet and exercise regularly\par The thyroid tests were normal\par

## 2023-10-17 ENCOUNTER — APPOINTMENT (OUTPATIENT)
Dept: ENDOCRINOLOGY | Facility: CLINIC | Age: 83
End: 2023-10-17
Payer: MEDICARE

## 2023-10-17 VITALS
DIASTOLIC BLOOD PRESSURE: 77 MMHG | SYSTOLIC BLOOD PRESSURE: 149 MMHG | HEIGHT: 59 IN | WEIGHT: 117 LBS | HEART RATE: 71 BPM | BODY MASS INDEX: 23.59 KG/M2 | OXYGEN SATURATION: 98 % | TEMPERATURE: 97.9 F | RESPIRATION RATE: 16 BRPM

## 2023-10-17 LAB — GLUCOSE BLDC GLUCOMTR-MCNC: 206

## 2023-10-17 PROCEDURE — 82962 GLUCOSE BLOOD TEST: CPT

## 2023-10-17 PROCEDURE — 99214 OFFICE O/P EST MOD 30 MIN: CPT | Mod: 25

## 2024-02-20 ENCOUNTER — APPOINTMENT (OUTPATIENT)
Dept: ENDOCRINOLOGY | Facility: CLINIC | Age: 84
End: 2024-02-20
Payer: MEDICARE

## 2024-02-20 VITALS
RESPIRATION RATE: 16 BRPM | TEMPERATURE: 96.6 F | DIASTOLIC BLOOD PRESSURE: 73 MMHG | OXYGEN SATURATION: 99 % | HEIGHT: 59 IN | SYSTOLIC BLOOD PRESSURE: 154 MMHG | HEART RATE: 78 BPM | BODY MASS INDEX: 22.58 KG/M2 | WEIGHT: 112 LBS

## 2024-02-20 PROCEDURE — 99214 OFFICE O/P EST MOD 30 MIN: CPT | Mod: 25

## 2024-02-20 PROCEDURE — 82962 GLUCOSE BLOOD TEST: CPT

## 2024-02-20 RX ORDER — GLIPIZIDE 2.5 MG/1
2.5 TABLET, EXTENDED RELEASE ORAL DAILY
Qty: 90 | Refills: 3 | Status: COMPLETED | COMMUNITY
End: 2024-02-20

## 2024-02-20 NOTE — DATA REVIEWED
[FreeTextEntry1] : The TSH, Free T4 are normal. The FBS and HbA1c indicates good diabetic control. Her Tg are elevated. She has elevated Tg.

## 2024-02-20 NOTE — HISTORY OF PRESENT ILLNESS
[FreeTextEntry1] : Patient feels well, she is asymptomatic. She has lost weight. She is exercising regularly and following the diet. Her blood sugars at home are well controlled, they are usually around 110 mg/dl. She denies low blood glucose during the night. She denies chest pain, or SOB. Denies numbness, tingling or burning sensation on her extremities. Taking her medications regularly. She has seen the Ophthalmologist recently. She has not seen the Podiatrist recently. She has not seen the Cardiologist recently.

## 2024-02-20 NOTE — ASSESSMENT
[FreeTextEntry1] : Patient is doing well She is losing weight The diabetes is well controlled Will discontinue Glipizide No Diabetic Retinopathy She has microalbuminuria The lipid panel was fine except for elevated Tg Will continue the same treatment Her BP was also fine Advised to take her medications regularly Advised to follow the diet and exercise regularly The thyroid tests were normal

## 2024-02-21 LAB — GLUCOSE BLDC GLUCOMTR-MCNC: 123

## 2024-06-21 ENCOUNTER — APPOINTMENT (OUTPATIENT)
Dept: ENDOCRINOLOGY | Facility: CLINIC | Age: 84
End: 2024-06-21
Payer: MEDICARE

## 2024-06-21 VITALS
RESPIRATION RATE: 16 BRPM | DIASTOLIC BLOOD PRESSURE: 79 MMHG | HEART RATE: 69 BPM | OXYGEN SATURATION: 98 % | TEMPERATURE: 98 F | SYSTOLIC BLOOD PRESSURE: 158 MMHG | WEIGHT: 110 LBS | HEIGHT: 59 IN | BODY MASS INDEX: 22.18 KG/M2

## 2024-06-21 DIAGNOSIS — E03.9 HYPOTHYROIDISM, UNSPECIFIED: ICD-10-CM

## 2024-06-21 DIAGNOSIS — E11.9 TYPE 2 DIABETES MELLITUS W/OUT COMPLICATIONS: ICD-10-CM

## 2024-06-21 DIAGNOSIS — E04.1 NONTOXIC SINGLE THYROID NODULE: ICD-10-CM

## 2024-06-21 LAB — GLUCOSE BLDC GLUCOMTR-MCNC: 154

## 2024-06-21 PROCEDURE — G2211 COMPLEX E/M VISIT ADD ON: CPT | Mod: NC

## 2024-06-21 PROCEDURE — 82962 GLUCOSE BLOOD TEST: CPT

## 2024-06-21 PROCEDURE — 99214 OFFICE O/P EST MOD 30 MIN: CPT | Mod: 25

## 2024-06-21 RX ORDER — SITAGLIPTIN 25 MG/1
25 TABLET, FILM COATED ORAL
Qty: 90 | Refills: 4 | Status: ACTIVE | COMMUNITY
Start: 2024-06-21 | End: 1900-01-01

## 2024-06-21 RX ORDER — LEVOTHYROXINE SODIUM 0.03 MG/1
25 TABLET ORAL
Qty: 90 | Refills: 3 | Status: ACTIVE | COMMUNITY
Start: 2018-02-21 | End: 1900-01-01

## 2024-06-21 RX ORDER — METFORMIN HYDROCHLORIDE 500 MG/1
500 TABLET, COATED ORAL
Qty: 240 | Refills: 3 | Status: ACTIVE | COMMUNITY
Start: 1900-01-01 | End: 1900-01-01

## 2024-06-21 NOTE — HISTORY OF PRESENT ILLNESS
[FreeTextEntry1] : Patient is doing well, denies feeling tired, having cold intolerance, or palpitations. Denies dryness of the skin or hair loss. She denies chest pain or SOB. Taking the Levothyroxine regularly 1/2 hour before breakfast. No weight change.

## 2024-06-21 NOTE — DATA REVIEWED
[FreeTextEntry1] : The US thyroid revealed the nodules stable. Her TSH and free T4 were fine. The FBS and HbA1c were increased. Her vitamin D was normal. She has microalbuminuria.

## 2024-06-21 NOTE — ASSESSMENT
[FreeTextEntry1] : Poor diabetic control She has lost weight Will add Januvia 50 mg po QD Will repeat the blood testing in 4 weeks She is on Losartan for kidney protection

## 2024-08-30 ENCOUNTER — APPOINTMENT (OUTPATIENT)
Dept: ENDOCRINOLOGY | Facility: CLINIC | Age: 84
End: 2024-08-30
Payer: MEDICARE

## 2024-08-30 VITALS
WEIGHT: 109 LBS | HEIGHT: 59 IN | HEART RATE: 73 BPM | OXYGEN SATURATION: 98 % | DIASTOLIC BLOOD PRESSURE: 74 MMHG | SYSTOLIC BLOOD PRESSURE: 170 MMHG | BODY MASS INDEX: 21.97 KG/M2 | RESPIRATION RATE: 16 BRPM | TEMPERATURE: 98 F

## 2024-08-30 DIAGNOSIS — E11.9 TYPE 2 DIABETES MELLITUS W/OUT COMPLICATIONS: ICD-10-CM

## 2024-08-30 DIAGNOSIS — E04.1 NONTOXIC SINGLE THYROID NODULE: ICD-10-CM

## 2024-08-30 DIAGNOSIS — E03.9 HYPOTHYROIDISM, UNSPECIFIED: ICD-10-CM

## 2024-08-30 PROCEDURE — 82962 GLUCOSE BLOOD TEST: CPT

## 2024-08-30 PROCEDURE — 99214 OFFICE O/P EST MOD 30 MIN: CPT | Mod: 25

## 2024-08-30 PROCEDURE — G2211 COMPLEX E/M VISIT ADD ON: CPT | Mod: NC

## 2024-08-30 NOTE — DATA REVIEWED
[FreeTextEntry1] : The TSH was slightly elevated with normal Free T4. The FBS remains borderline elevated and the hbA1c was improved. Her kidney function was fine. No microalbuminuria.

## 2024-08-30 NOTE — ASSESSMENT
[FreeTextEntry1] : Patient is doing well Her weight has decreased The diabetic control has improved No Diabetic Retinopathy or Nephropathy present at this time The lipid panel was abnormal her Tg are elevated Advised to take her medications regularly Advised to follow the diet and exercise regularly The thyroid tests revealed mild elevation of her TSH She does not want to raise the Levothyroxine dose slightly She says she will get confused

## 2024-08-30 NOTE — HISTORY OF PRESENT ILLNESS
[FreeTextEntry1] : Patient feels well, she is asymptomatic. She has lost weight. She is exercising regularly and following the diet. Her blood sugars at home are not well controlled, they are usually around 120 to 130 mg/dl. She denies low blood glucose during the night. She denies chest pain, or SOB. Denies numbness, tingling or burning sensation on her extremities. Taking her medications regularly. She has not seen the Ophthalmologist recently. She has not seen the Podiatrist recently. She has not seen the Cardiologist recently.

## 2024-08-31 LAB — GLUCOSE BLDC GLUCOMTR-MCNC: 213

## 2024-11-19 ENCOUNTER — APPOINTMENT (OUTPATIENT)
Dept: ENDOCRINOLOGY | Facility: CLINIC | Age: 84
End: 2024-11-19
Payer: MEDICARE

## 2024-11-19 VITALS
DIASTOLIC BLOOD PRESSURE: 82 MMHG | HEIGHT: 59 IN | OXYGEN SATURATION: 97 % | BODY MASS INDEX: 21.37 KG/M2 | SYSTOLIC BLOOD PRESSURE: 149 MMHG | HEART RATE: 76 BPM | RESPIRATION RATE: 16 BRPM | TEMPERATURE: 97.3 F | WEIGHT: 106 LBS

## 2024-11-19 DIAGNOSIS — E03.9 HYPOTHYROIDISM, UNSPECIFIED: ICD-10-CM

## 2024-11-19 DIAGNOSIS — E11.9 TYPE 2 DIABETES MELLITUS W/OUT COMPLICATIONS: ICD-10-CM

## 2024-11-19 DIAGNOSIS — E78.5 HYPERLIPIDEMIA, UNSPECIFIED: ICD-10-CM

## 2024-11-19 LAB — GLUCOSE BLDC GLUCOMTR-MCNC: 124

## 2024-11-19 PROCEDURE — 99214 OFFICE O/P EST MOD 30 MIN: CPT | Mod: 25

## 2024-11-19 PROCEDURE — 82962 GLUCOSE BLOOD TEST: CPT

## 2025-03-11 ENCOUNTER — APPOINTMENT (OUTPATIENT)
Dept: ENDOCRINOLOGY | Facility: CLINIC | Age: 85
End: 2025-03-11
Payer: MEDICARE

## 2025-03-11 VITALS
OXYGEN SATURATION: 99 % | DIASTOLIC BLOOD PRESSURE: 73 MMHG | HEART RATE: 79 BPM | TEMPERATURE: 97.2 F | BODY MASS INDEX: 20.36 KG/M2 | SYSTOLIC BLOOD PRESSURE: 125 MMHG | RESPIRATION RATE: 16 BRPM | HEIGHT: 59 IN | WEIGHT: 101 LBS

## 2025-03-11 DIAGNOSIS — E11.9 TYPE 2 DIABETES MELLITUS W/OUT COMPLICATIONS: ICD-10-CM

## 2025-03-11 DIAGNOSIS — E04.1 NONTOXIC SINGLE THYROID NODULE: ICD-10-CM

## 2025-03-11 DIAGNOSIS — E03.9 HYPOTHYROIDISM, UNSPECIFIED: ICD-10-CM

## 2025-03-11 LAB — GLUCOSE BLDC GLUCOMTR-MCNC: 135

## 2025-03-11 PROCEDURE — 82962 GLUCOSE BLOOD TEST: CPT

## 2025-03-11 PROCEDURE — 99214 OFFICE O/P EST MOD 30 MIN: CPT | Mod: 25

## 2025-03-13 RX ORDER — LANCETS
EACH MISCELLANEOUS
Qty: 1 | Refills: 3 | Status: ACTIVE | COMMUNITY
Start: 2025-03-12 | End: 1900-01-01

## 2025-07-08 ENCOUNTER — NON-APPOINTMENT (OUTPATIENT)
Age: 85
End: 2025-07-08

## 2025-07-08 ENCOUNTER — APPOINTMENT (OUTPATIENT)
Dept: ENDOCRINOLOGY | Facility: CLINIC | Age: 85
End: 2025-07-08
Payer: MEDICARE

## 2025-07-08 VITALS
SYSTOLIC BLOOD PRESSURE: 153 MMHG | RESPIRATION RATE: 16 BRPM | HEART RATE: 64 BPM | DIASTOLIC BLOOD PRESSURE: 73 MMHG | OXYGEN SATURATION: 98 % | BODY MASS INDEX: 21.57 KG/M2 | WEIGHT: 107 LBS | HEIGHT: 59 IN | TEMPERATURE: 96 F

## 2025-07-08 LAB — GLUCOSE BLDC GLUCOMTR-MCNC: 127

## 2025-07-08 PROCEDURE — 99214 OFFICE O/P EST MOD 30 MIN: CPT | Mod: 25

## 2025-07-08 PROCEDURE — 82962 GLUCOSE BLOOD TEST: CPT
